# Patient Record
Sex: MALE | Race: WHITE | NOT HISPANIC OR LATINO | Employment: FULL TIME | URBAN - METROPOLITAN AREA
[De-identification: names, ages, dates, MRNs, and addresses within clinical notes are randomized per-mention and may not be internally consistent; named-entity substitution may affect disease eponyms.]

---

## 2021-03-24 ENCOUNTER — TELEPHONE (OUTPATIENT)
Dept: OBGYN CLINIC | Facility: HOSPITAL | Age: 46
End: 2021-03-24

## 2021-03-24 NOTE — TELEPHONE ENCOUNTER
Patient is calling in wanting to make an appointment to be seen with Dr Kennedy Ann in [address] [place] [address] for a right foot fracture  The  does not have any "new patient" spots available so information forwarded to practice admin to assist further         Call back# [phone]

## 2021-04-05 NOTE — TELEPHONE ENCOUNTER
Patient called back and was looking for an appt, no one called him yet regarding an appt      cb - [phone]

## 2021-04-07 NOTE — TELEPHONE ENCOUNTER
Patient called back requesting an appt  Per task,  I scheduled an appt in a open slot on 4/9 @215pm  Is this ok?

## 2021-04-09 ENCOUNTER — OFFICE VISIT (OUTPATIENT)
Dept: OBGYN CLINIC | Facility: CLINIC | Age: 46
End: 2021-04-09
Payer: OTHER MISCELLANEOUS

## 2021-04-09 VITALS — WEIGHT: 220 LBS | HEIGHT: 75 IN | BODY MASS INDEX: 27.35 KG/M2

## 2021-04-09 DIAGNOSIS — S92.354A NONDISPLACED FRACTURE OF FIFTH METATARSAL BONE, RIGHT FOOT, INITIAL ENCOUNTER FOR CLOSED FRACTURE: Primary | ICD-10-CM

## 2021-04-09 DIAGNOSIS — M79.671 PAIN IN RIGHT FOOT: ICD-10-CM

## 2021-04-09 PROCEDURE — 28470 CLTX METATARSAL FX WO MNP EA: CPT | Performed by: PHYSICIAN ASSISTANT

## 2021-04-09 PROCEDURE — 99203 OFFICE O/P NEW LOW 30 MIN: CPT | Performed by: PHYSICIAN ASSISTANT

## 2021-04-09 RX ORDER — ASPIRIN 81 MG/1
81 TABLET, CHEWABLE ORAL DAILY
COMMUNITY

## 2021-04-09 NOTE — LETTER
April 9, 2021     Patient: Danyelle Arrington   YOB: 1975   Date of Visit: 4/9/2021       To Whom it May Concern:    Luz Elena Wise is under my professional care  He was seen in my office on 4/9/2021  He is not cleared to return to work full duty at this time  He may return to work sedentary desk duty only on Monday 4/12/21  He should be allowed to elevate and ice his foot at his desk for swelling control  We will see him back in 4 weeks and update his work status at that time  If you have any questions or concerns, please don't hesitate to call           Sincerely,          Kulwinder Scherer MD        CC: No Recipients

## 2021-04-09 NOTE — PATIENT INSTRUCTIONS
Weightbearing as tolerated in CAM boot  Do not need to wear the boot for sleep or showering but should wear it any time you are walking on it  Recommend taking the following supplements: Vitamin D3 4000 units per day and Calcium 1200 mg per day  This will help with bone healing  Avoid NSAIDs like Motrin/Aleve/Ibuprofen  Avoid steroids/nicotine products/ rheumatoid medications like DMARDs if possible  Aspirin 81 mg BID for blood clot prevention  Script provided      Knee high compression stocking 20/30mm HG of pressure

## 2021-04-09 NOTE — PROGRESS NOTES
YEIMI Lopez  Attending, Orthopaedic Surgery  [facility] [address] [facility]        ORTHOPAEDIC FOOT AND ANKLE CLINIC VISIT     Assessment:     Encounter Diagnoses   Name Primary?  Pain in right foot     Nondisplaced fracture of fifth metatarsal bone, right foot, initial encounter for closed fracture Yes              Plan:   · The patient verbalized understanding of exam findings and treatment plan  We engaged in the shared decision-making process and treatment options were discussed at length with the patient  Surgical and conservative management discussed today along with risks and benefits  · He has been [facility] in [facility] for the past 2 weeks for treatment of 5th metatarsal base fracture, zone 1  We will continue non-operative treatment  · Cast removed today   · May be WBAT in cam boot for the next 4 weeks  · Continue ASA BID for DVT ppx  · Compression stocking for swelling control  · Vitamin D and calcium supplementation  · Tylenol as needed for pain, avoid NSAIDs  · Work note provided today  Return in about 4 weeks (around 5/7/2021) for fracture care follow up  No xray needed      History of Present Illness:   Chief Complaint:   Chief Complaint   Patient presents with    Right Foot - Fracture     [place] is a 41 y o  male who is being seen for right foot injury  Patient reports 2 weeks ago, he rolled his ankle inward while at work and injured his lateral foot  Pain is localized at base of 5th MT with minimal radiating and described as sharp and severe  Patient denies numbness, tingling or radicular pain  Denies history of neuropathy  Patient does  smoke, does not have diabetes and does not take blood thinners other than baby ASA once daily  Patient denies family history of anesthesia complications and has not had any complications with anesthesia   Of note, he was initially treated for his 5th metatarsal fracture by Dr Roman Alexander and placed him in a short leg cast  He was told Dr Kalpana Hunt was going to be out of the office until May so his worker's comp requested he follow up with another foot and ankle surgeon sooner  Pain/symptom timing:  Worse during the day when active  Pain/symptom context:  Worse with activites and work  Pain/symptom modifying factors:  Rest makes better, activities make worse  Pain/symptom associated signs/symptoms: none    Prior treatment   · NSAIDsNo    · Injections No   · Bracing/Orthotics Yes - short leg cast placed by Dr Kalpana Hunt  · Physical Therapy No     Orthopedic Surgical History:   See below     Past Medical, Surgical and Social History:  Past Medical History:  has no past medical history on file  Problem List: does not have a problem list on file  Past Surgical History:  has a past surgical history that includes Knee surgery (Left)  Family History: family history is not on file  Social History:  reports that he has been smoking  He has been smoking about 1 00 pack per day  He has never used smokeless tobacco  He reports that he does not drink alcohol or use drugs  Current Medications: has a current medication list which includes the following prescription(s): aspirin  Allergies: has No Known Allergies  Review of Systems:  General- denies fever/chills  HEENT- denies hearing loss or sore throat  Eyes- denies eye pain or visual disturbances, denies red eyes  Respiratory- denies cough or SOB  Cardio- denies chest pain or palpitations  GI- denies abdominal pain  Endocrine- denies urinary frequency  Urinary- denies pain with urination  Musculoskeletal- Negative except noted above  Skin- denies rashes or wounds  Neurological- denies dizziness or headache  Psychiatric- denies anxiety or difficulty concentrating    Physical Exam:   Ht 6' 3" (1 905 m)   Wt 99 8 kg (220 lb)   BMI 27 50 kg/m²   General/Constitutional: No apparent distress: well-nourished and well developed    Eyes: normal ocular motion  Cardio: RRR, Normal S1S2, No m/r/g  Lymphatic: No appreciable lymphadenopathy  Respiratory: Non-labored breathing, CTA b/l no w/c/r  Vascular: No edema, swelling or tenderness, except as noted in detailed exam   Integumentary: No impressive skin lesions present, except as noted in detailed exam   Neuro: No ataxia or tremors noted  Psych: Normal mood and affect, oriented to person, place and time  Appropriate affect  Musculoskeletal: Normal, except as noted in detailed exam and in HPI  Examination    Right    Gait Not assessed due to ankle fracture   Musculoskeletal Tender to palpation at base of 5th metatarsal    Skin Normal       Nails Normal    Range of Motion  Not assessed due to metatarsal fracture    Stability Stable    Muscle Strength Not assessed due to metatarsal fracture  5/5 EHL  5/5 FHL    Neurologic Normal    Sensation  Intact to light touch throughout sural, saphenous, superficial peroneal, deep peroneal and medial/lateral plantar nerve distributions  [place]-Nasir 5 07 filament (10g) testing  deferred  Cardiovascular Brisk capillary refill < 2 seconds,intact DP and PT pulses    Special Tests None      Imaging Studies:   3 views of the right foot were taken, reviewed and interpreted independently that demonstrate minimally displaced 5th metatarsal base fracture, zone 1  Reviewed by me personally  Fracture / Dislocation Treatment    Date/Time: 4/9/2021 3:09 PM  Performed by: Beto San PA-C  Authorized by: Cruzito Moran MD     Patient Location:  Clinic  Verbal consent obtained?: Yes    Risks and benefits: Risks, benefits and alternatives were discussed    Consent given by:  Patient  Patient states understanding of procedure being performed: Yes    Radiology Images displayed and confirmed   If images not available, report reviewed: Yes    Patient identity confirmed:  Verbally with patient  Injury location:  Foot  Location details:  Right foot  Injury type:  Fracture  Fracture type: fifth metatarsal    Neurovascular status: Neurovascularly intact    Distal perfusion: normal    Neurological function: normal    Range of motion: normal    Manipulation performed?: No    Immobilization:  Brace (cam boot)  Supplies used:  Elastic bandage  Neurovascular status: Neurovascularly intact    Distal perfusion: normal    Neurological function: normal    Range of motion: normal    Patient tolerance:  Patient tolerated the procedure well with no immediate complications          Scribe Attestation    I,:  Maribell Lovett PA-C am acting as a scribe while in the presence of the attending physician :       I,:  Maximino Kent MD personally performed the services described in this documentation    as scribed in my presence :               Christyne Kemps Lachman, MD  Foot & Ankle Surgery   Department of [address] [place] [address]      I personally performed the service  Christyne Kemps Lachman, MD

## 2021-04-13 ENCOUNTER — TELEPHONE (OUTPATIENT)
Dept: OBGYN CLINIC | Facility: HOSPITAL | Age: 46
End: 2021-04-13

## 2021-04-13 NOTE — TELEPHONE ENCOUNTER
Prasanth from [facility] is calling to request ovn and w/s letter from 04/09/21 with Dr Josh King  Faxed both to:    Franchesca Reyes  X#[phone]

## 2021-04-22 ENCOUNTER — TELEPHONE (OUTPATIENT)
Dept: OBGYN CLINIC | Facility: HOSPITAL | Age: 46
End: 2021-04-22

## 2021-04-22 NOTE — TELEPHONE ENCOUNTER
Patient sees Dr Valeriano Mendosa is calling in from the [facility] the DME provider  She is asking for the ALEJANDRO Yanes information and the  name and phone number she is going to reach out to the patient to gather more information

## 2021-05-07 ENCOUNTER — OFFICE VISIT (OUTPATIENT)
Dept: OBGYN CLINIC | Facility: CLINIC | Age: 46
End: 2021-05-07

## 2021-05-07 VITALS — BODY MASS INDEX: 27.35 KG/M2 | WEIGHT: 220 LBS | HEIGHT: 75 IN

## 2021-05-07 DIAGNOSIS — S92.354A NONDISPLACED FRACTURE OF FIFTH METATARSAL BONE, RIGHT FOOT, INITIAL ENCOUNTER FOR CLOSED FRACTURE: Primary | ICD-10-CM

## 2021-05-07 PROCEDURE — 99024 POSTOP FOLLOW-UP VISIT: CPT | Performed by: ORTHOPAEDIC SURGERY

## 2021-05-07 NOTE — PROGRESS NOTES
YEIMI Vallejo  Attending, Orthopaedic Surgery  [facility] [address] [facility]      ORTHOPAEDIC FOOT AND ANKLE CLINIC VISIT     Assessment:     Encounter Diagnosis   Name Primary?  Nondisplaced fracture of fifth metatarsal bone, right foot, initial encounter for closed fracture Yes            Plan:   · The patient verbalized understanding of exam findings and treatment plan  We engaged in the shared decision-making process and treatment options were discussed at length with the patient  Surgical and conservative management discussed today along with risks and benefits  · We will wean the CAM boot over the next 1 week  · Gradual return to ADLs  Swelling and pain normal as he returns to activities  · Continue Vitamin D and Calcium for 6 more weeks  · Elevation and Ice/ Compression stocking as needed to help control swelling  · Supportive shoes  · See back PRN      History of Present Illness:   Chief Complaint: Right foot fracture  Robyn Wright is a 41 y o  male who is being seen in follow-up for Right foot fracture  When we last saw he we recommended CAM boot  Pain has improved  Residual pain is localized at right ankle with minimal radiating and described as sharp and severe  Pain/symptom timing:  Worse during the day when active  Pain/symptom context:  Worse with activites and work  Pain/symptom modifying factors:  Rest makes better, activities make worse  Pain/symptom associated signs/symptoms: none    Prior treatment   · NSAIDsYes   · Injections No   · Bracing/Orthotics Yes    · Physical Therapy No     Orthopedic Surgical History:   See below    Past Medical, Surgical and Social History:  Past Medical History:  has no past medical history on file  Problem List: does not have any pertinent problems on file  Past Surgical History:  has a past surgical history that includes Knee surgery (Left)  Family History: family history is not on file    Social History:  reports that he has been smoking  He has been smoking about 1 00 pack per day  He has never used smokeless tobacco  He reports that he does not drink alcohol or use drugs  Current Medications: has a current medication list which includes the following prescription(s): aspirin  Allergies: has No Known Allergies  Review of Systems:  General- denies fever/chills  HEENT- denies hearing loss or sore throat  Eyes- denies eye pain or visual disturbances, denies red eyes  Respiratory- denies cough or SOB  Cardio- denies chest pain or palpitations  GI- denies abdominal pain  Endocrine- denies urinary frequency  Urinary- denies pain with urination  Musculoskeletal- Negative except noted above  Skin- denies rashes or wounds  Neurological- denies dizziness or headache  Psychiatric- denies anxiety or difficulty concentrating    Physical Exam:   There were no vitals taken for this visit  General/Constitutional: No apparent distress: well-nourished and well developed  Eyes: normal ocular motion  Lymphatic: No appreciable lymphadenopathy  Respiratory: Non-labored breathing  Vascular: No edema, swelling or tenderness, except as noted in detailed exam   Integumentary: No impressive skin lesions present, except as noted in detailed exam   Neuro: No ataxia or tremors noted  Psych: Normal mood and affect, oriented to person, place and time  Appropriate affect  Musculoskeletal: Normal, except as noted in detailed exam and in HPI      Examination    Right    Gait Antalgic   Musculoskeletal Minimal TTP over fracture site    Skin Normal       Nails Normal    Range of Motion  20 degrees dorsiflexion, 30 degrees plantarflexion  Subtalar motion: normal    Stability Stable    Muscle Strength 5/5 tibialis anterior  5/5 gastrocnemius-soleus  5/5 posterior tibialis  5/5 peroneal/eversion strength  5/5 EHL  5/5 FHL    Neurologic Normal    Sensation  Intact to light touch throughout sural, saphenous, superficial peroneal, deep peroneal and medial/lateral plantar nerve distributions  [place]-Nasir 5 07 filament (10g) testing deferred  Cardiovascular Brisk capillary refill < 2 seconds,intact DP and PT pulses    Special Tests None      Imaging Studies:     No new imaging          James R Lachman, MD  Foot & Ankle Surgery   Department of [address] [place] [address]      I personally performed the service  Alene Fordyce Lachman, MD

## 2021-05-07 NOTE — LETTER
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

## 2021-05-10 ENCOUNTER — TELEPHONE (OUTPATIENT)
Dept: OBGYN CLINIC | Facility: OTHER | Age: 46
End: 2021-05-10

## 2021-05-10 NOTE — TELEPHONE ENCOUNTER
Prasanth @ [facility]  is requesting Office Notes and Work Letter from 05-07 to be faxed       Fax # [phone]  Attn : Micaela Patel

## 2021-05-10 NOTE — TELEPHONE ENCOUNTER
Pao Bazan is calling back with questions in regards to the note  If patient is allowed light duty for next three weeks then full duty on 06-25  What does he do for the in between ? Fax letter when completed        Fax # [phone]  Attn : Pao Bazan

## 2021-05-11 NOTE — TELEPHONE ENCOUNTER
Our note is unclear, I attempted to call the patient to clarify what he would like the note to say  He did not answer, I left a voicemail  I am trying to determine if he wants to return to light duty now or in 3 weeks   I will try to call him back later this afternoon if he doesn't call back,

## 2021-05-11 NOTE — TELEPHONE ENCOUNTER
I have called patient multiple times with no answer  Also left voice mail with his significant other Elainedony Craignayeli listed in his chart to have the patient call us back  I cannot clarify the work note until we get a call back

## 2021-05-12 NOTE — TELEPHONE ENCOUNTER
Oralia Heaton is calling in from [employer] wanting to know if an updated work note has been created for this patient as per prev notes we are trying to reach out to patient to clarify  He is going to have the patient reach out to us

## 2021-05-18 NOTE — TELEPHONE ENCOUNTER
Sienna, can you clarify this work note? He has a very routine injury so we can just write it if he won't answer his phone or call us back

## 2021-05-18 NOTE — TELEPHONE ENCOUNTER
I sent a work note stating that he may return to work light duty/desk duty on 5/28/21, which was 3 weeks from his last appointment and return to full duty without restrictions on 6/25/21  Hopefully this answers all questions  I attempted to call patient again but no answer  Could we please fax the new note to Prasanth Murillo at the number below?

## 2021-05-18 NOTE — TELEPHONE ENCOUNTER
Prasanth Murillo is calling to find out if the patient has contacted us to clear up the matter  [facility] states that he was told that someone was suppose to call the patient to clarify his status  [facility] is questioning why we have to discuss this with the patient first & would like someone to call him back at  [phone]  [facility] is also asking that we fax the work note to him at [phone] [facility]